# Patient Record
Sex: MALE | Race: OTHER | HISPANIC OR LATINO | Employment: FULL TIME | URBAN - METROPOLITAN AREA
[De-identification: names, ages, dates, MRNs, and addresses within clinical notes are randomized per-mention and may not be internally consistent; named-entity substitution may affect disease eponyms.]

---

## 2023-12-14 ENCOUNTER — HOSPITAL ENCOUNTER (EMERGENCY)
Facility: MEDICAL CENTER | Age: 23
End: 2023-12-14
Attending: EMERGENCY MEDICINE
Payer: COMMERCIAL

## 2023-12-14 ENCOUNTER — APPOINTMENT (OUTPATIENT)
Dept: RADIOLOGY | Facility: MEDICAL CENTER | Age: 23
End: 2023-12-14
Attending: EMERGENCY MEDICINE
Payer: COMMERCIAL

## 2023-12-14 VITALS
TEMPERATURE: 98.4 F | OXYGEN SATURATION: 100 % | WEIGHT: 162.7 LBS | HEART RATE: 69 BPM | RESPIRATION RATE: 16 BRPM | SYSTOLIC BLOOD PRESSURE: 124 MMHG | DIASTOLIC BLOOD PRESSURE: 74 MMHG

## 2023-12-14 DIAGNOSIS — J45.901 ASTHMA WITH ACUTE EXACERBATION, UNSPECIFIED ASTHMA SEVERITY, UNSPECIFIED WHETHER PERSISTENT: ICD-10-CM

## 2023-12-14 DIAGNOSIS — Z71.6 ENCOUNTER FOR SMOKING CESSATION COUNSELING: ICD-10-CM

## 2023-12-14 LAB — EKG IMPRESSION: NORMAL

## 2023-12-14 PROCEDURE — 93005 ELECTROCARDIOGRAM TRACING: CPT | Performed by: EMERGENCY MEDICINE

## 2023-12-14 PROCEDURE — 700111 HCHG RX REV CODE 636 W/ 250 OVERRIDE (IP): Performed by: EMERGENCY MEDICINE

## 2023-12-14 PROCEDURE — 94640 AIRWAY INHALATION TREATMENT: CPT

## 2023-12-14 PROCEDURE — 99284 EMERGENCY DEPT VISIT MOD MDM: CPT

## 2023-12-14 PROCEDURE — 93005 ELECTROCARDIOGRAM TRACING: CPT

## 2023-12-14 PROCEDURE — 71045 X-RAY EXAM CHEST 1 VIEW: CPT

## 2023-12-14 PROCEDURE — 36415 COLL VENOUS BLD VENIPUNCTURE: CPT

## 2023-12-14 PROCEDURE — 700101 HCHG RX REV CODE 250: Performed by: EMERGENCY MEDICINE

## 2023-12-14 RX ORDER — PREDNISONE 20 MG/1
60 TABLET ORAL ONCE
Status: COMPLETED | OUTPATIENT
Start: 2023-12-14 | End: 2023-12-14

## 2023-12-14 RX ORDER — IPRATROPIUM BROMIDE AND ALBUTEROL SULFATE 2.5; .5 MG/3ML; MG/3ML
3 SOLUTION RESPIRATORY (INHALATION) ONCE
Status: COMPLETED | OUTPATIENT
Start: 2023-12-14 | End: 2023-12-14

## 2023-12-14 RX ORDER — ALBUTEROL SULFATE 90 UG/1
2 AEROSOL, METERED RESPIRATORY (INHALATION) EVERY 6 HOURS PRN
Qty: 8.5 G | Refills: 0 | Status: SHIPPED | OUTPATIENT
Start: 2023-12-14

## 2023-12-14 RX ORDER — PREDNISONE 20 MG/1
60 TABLET ORAL DAILY
Qty: 15 TABLET | Refills: 0 | Status: SHIPPED | OUTPATIENT
Start: 2023-12-14 | End: 2023-12-19

## 2023-12-14 RX ADMIN — PREDNISONE 60 MG: 20 TABLET ORAL at 20:30

## 2023-12-14 RX ADMIN — IPRATROPIUM BROMIDE AND ALBUTEROL SULFATE 3 ML: 2.5; .5 SOLUTION RESPIRATORY (INHALATION) at 20:36

## 2023-12-14 ASSESSMENT — PAIN DESCRIPTION - PAIN TYPE: TYPE: ACUTE PAIN

## 2023-12-15 NOTE — ED TRIAGE NOTES
Chief Complaint   Patient presents with    Chest Pain      Pt states mid sternal CP radiating to back x 3 days. SOB x 3 days.     Interpretor ID number: [id number]    /75   Pulse 60   Temp 36.7 °C (98 °F) (Temporal)   Resp 16   Wt 73.8 kg (162 lb 11.2 oz)   SpO2 99%

## 2023-12-15 NOTE — DISCHARGE INSTRUCTIONS
You were seen in the ER for chest tightness.  I think your symptoms are due to an asthma attack.  I have called in a prescription for medication on your behalf, take it as directed.  I recommend that you establish with a primary care physician.  Remember to stop smoking!  Return to the ER at anytime with new or worsening symptoms.  I am glad you feel better!

## 2023-12-15 NOTE — ED NOTES
Pt discharged home. GCS 15. Pt in possession of belongings. Pt provided discharge education and information pertaining to medications and follow up appointments. Pt received copy of discharge instructions and verbalized understanding.     Vitals:    12/14/23 2052   BP: 124/74   Pulse: 69   Resp: 16   Temp: 36.9 °C (98.4 °F)   SpO2: 100%

## 2023-12-15 NOTE — ED PROVIDER NOTES
ED Provider Note    CHIEF COMPLAINT  Chief Complaint   Patient presents with    Chest Pain       EXTERNAL RECORDS REVIEWED  Other none    HPI/ROS  LIMITATION TO HISTORY   Select: Language Azeri,  Used   OUTSIDE HISTORIAN(S):  Family cousin bedside tells me that the patient used to have asthma when he was younger.    Vasu Alejandre is a 23 y.o. male who presents with chest tightness that has been ongoing for the past 2 or 3 days.  The patient states that he used to have asthma when he was younger but has run out of all of his medication and has not followed up with a primary care physician for several years.  He has some mild shortness of breath with the chest tightness but denies any fevers or chills.  He has had a nonproductive cough but denies any abdominal pain, nausea, vomiting, diarrhea.  He does not have a history of hypertension, hyperlipidemia, or diabetes.  He has never had a heart attack.  He does not have any leg swelling.  Despite the nursing staff chief complaint of chest pain he denies pain just tightness.  He does not have any hemoptysis and denies any recent long distance travel or surgeries.  He does not take exogenous hormones.  He does smoke cigarettes daily and drinks alcohol on occasion but not frequently.  He does not have a primary care physician.    PAST MEDICAL HISTORY       SURGICAL HISTORY  patient denies any surgical history    FAMILY HISTORY  History reviewed. No pertinent family history.    SOCIAL HISTORY  Social History     Tobacco Use    Smoking status: Every Day     Types: Cigarettes    Smokeless tobacco: Never   Vaping Use    Vaping Use: Never used   Substance and Sexual Activity    Alcohol use: Yes    Drug use: Never    Sexual activity: Not on file       CURRENT MEDICATIONS  Home Medications    **Home medications have not yet been reviewed for this encounter**         ALLERGIES  No Known Allergies    PHYSICAL EXAM  VITAL SIGNS: /75   Pulse 60   Temp 36.7  °C (98 °F) (Temporal)   Resp 16   Wt 73.8 kg (162 lb 11.2 oz)   SpO2 99%    Physical Exam  Vitals and nursing note reviewed.   Constitutional:       Appearance: He is well-developed.   HENT:      Head: Normocephalic and atraumatic.   Eyes:      Extraocular Movements: Extraocular movements intact.      Pupils: Pupils are equal, round, and reactive to light.   Cardiovascular:      Rate and Rhythm: Normal rate and regular rhythm.      Heart sounds: Normal heart sounds.   Pulmonary:      Breath sounds: Decreased breath sounds present.      Comments: Globally decreased breath sounds bilaterally.  Musculoskeletal:      Cervical back: Normal range of motion and neck supple.      Right lower leg: No edema.      Left lower leg: No edema.   Skin:     General: Skin is warm and dry.   Neurological:      General: No focal deficit present.      Mental Status: He is alert and oriented to person, place, and time.   Psychiatric:         Mood and Affect: Mood normal.         Behavior: Behavior normal.       DIAGNOSTIC STUDIES / PROCEDURES  RADIOLOGY  I have independently interpreted the diagnostic imaging associated with this visit and am waiting the final reading from the radiologist.   My preliminary interpretation is as follows: No lobar pneumonia or mass  Radiologist interpretation:   DX-CHEST-PORTABLE (1 VIEW)   Final Result         1.  No acute cardiopulmonary disease.        COURSE & MEDICAL DECISION MAKING    ED Observation Status? No; Patient does not meet criteria for ED Observation.     INITIAL ASSESSMENT, COURSE AND PLAN  Care Narrative: This is a 23-year-old gentleman with a history of asthma who is here with chest tightness that has been ongoing for the past 2 or 3 days.  Differential diagnosis includes, but is not limited to, asthma exacerbation, viral syndrome, pneumothorax, PE, ACS, pneumonia.    Arrives afebrile with normal vital signs.  Appears well-hydrated and nontoxic.  He has diffusely decreased breath  sounds but no significant wheezes.  He is not in any respiratory distress.  There is no stridor or rhonchi.  O2 sats are in the high 90s on room air.  Spite nursing staff chief complaint of chest pain the patient denies any chest pain whatsoever.  Instead, he notes that he has chest tightness.  EKG does not suggest ischemia and I have a very low suspicion for ACS.  Chest x-ray was obtained which did not reveal acute abnormality.  Patient was given a dose of prednisone and a DuoNeb after which he felt significant improvement.  I suspect his symptoms are due to an asthma exacerbation.  We had extensive conversation during which I counseled him on smoking cessation.  I have asked him to follow-up with a primary care physician.  He was discharged in good and stable condition with prescriptions for prednisone and albuterol inhaler.    ADDITIONAL PROBLEM LIST  None  DISPOSITION AND DISCUSSIONS  I have discussed management of the patient with the following physicians and JEAN's: None    Discussion of management with other QHP or appropriate source(s): None     Escalation of care considered, and ultimately not performed:IV fluids, blood analysis, and acute inpatient care management, however at this time, the patient is most appropriate for outpatient management    Barriers to care at this time, including but not limited to: Patient does not have established PCP.     Decision tools and prescription drugs considered including, but not limited to:  Albuterol and prednisone .    FINAL DIAGNOSIS  1. Asthma with acute exacerbation, unspecified asthma severity, unspecified whether persistent    2. Encounter for smoking cessation counseling      Electronically signed by: Salazar Ruelas M.D., 12/14/2023 8:17 PM